# Patient Record
Sex: MALE | Race: WHITE
[De-identification: names, ages, dates, MRNs, and addresses within clinical notes are randomized per-mention and may not be internally consistent; named-entity substitution may affect disease eponyms.]

---

## 2018-01-31 ENCOUNTER — HOSPITAL ENCOUNTER (OUTPATIENT)
Dept: HOSPITAL 94 - SSTAY O | Age: 58
Discharge: HOME | End: 2018-01-31
Attending: INTERNAL MEDICINE
Payer: MEDICARE

## 2018-01-31 VITALS — DIASTOLIC BLOOD PRESSURE: 76 MMHG | SYSTOLIC BLOOD PRESSURE: 122 MMHG

## 2018-01-31 VITALS — DIASTOLIC BLOOD PRESSURE: 80 MMHG | SYSTOLIC BLOOD PRESSURE: 112 MMHG

## 2018-01-31 VITALS — SYSTOLIC BLOOD PRESSURE: 116 MMHG | DIASTOLIC BLOOD PRESSURE: 94 MMHG

## 2018-01-31 VITALS — DIASTOLIC BLOOD PRESSURE: 78 MMHG | SYSTOLIC BLOOD PRESSURE: 137 MMHG

## 2018-01-31 VITALS — SYSTOLIC BLOOD PRESSURE: 139 MMHG | DIASTOLIC BLOOD PRESSURE: 99 MMHG

## 2018-01-31 VITALS — SYSTOLIC BLOOD PRESSURE: 135 MMHG | DIASTOLIC BLOOD PRESSURE: 74 MMHG

## 2018-01-31 VITALS — SYSTOLIC BLOOD PRESSURE: 145 MMHG | DIASTOLIC BLOOD PRESSURE: 80 MMHG

## 2018-01-31 VITALS — HEIGHT: 69 IN | WEIGHT: 225.53 LBS | BODY MASS INDEX: 33.4 KG/M2

## 2018-01-31 VITALS — DIASTOLIC BLOOD PRESSURE: 73 MMHG | SYSTOLIC BLOOD PRESSURE: 126 MMHG

## 2018-01-31 VITALS — SYSTOLIC BLOOD PRESSURE: 146 MMHG | DIASTOLIC BLOOD PRESSURE: 71 MMHG

## 2018-01-31 VITALS — DIASTOLIC BLOOD PRESSURE: 67 MMHG | SYSTOLIC BLOOD PRESSURE: 125 MMHG

## 2018-01-31 VITALS — DIASTOLIC BLOOD PRESSURE: 71 MMHG | SYSTOLIC BLOOD PRESSURE: 109 MMHG

## 2018-01-31 VITALS — SYSTOLIC BLOOD PRESSURE: 133 MMHG | DIASTOLIC BLOOD PRESSURE: 79 MMHG

## 2018-01-31 VITALS — SYSTOLIC BLOOD PRESSURE: 122 MMHG | DIASTOLIC BLOOD PRESSURE: 82 MMHG

## 2018-01-31 DIAGNOSIS — I25.10: Primary | ICD-10-CM

## 2018-01-31 DIAGNOSIS — I10: ICD-10-CM

## 2018-01-31 DIAGNOSIS — E78.5: ICD-10-CM

## 2018-01-31 DIAGNOSIS — M19.90: ICD-10-CM

## 2018-01-31 DIAGNOSIS — Z95.5: ICD-10-CM

## 2018-01-31 DIAGNOSIS — E03.9: ICD-10-CM

## 2018-01-31 DIAGNOSIS — F17.210: ICD-10-CM

## 2018-01-31 LAB
HCT VFR BLD CALC: 35 %PCV (ref 42–52)
HCT VFR BLD CALC: 35 %PCV (ref 42–52)
HGB BLD CALC-MCNC: 11.9 G/DL (ref 14–18)
SAO2 % BLDA FROM PO2: 99 % (ref 95–98)
SAO2 % BLDV FROM PO2: 54 % (ref 60–80)
SPECIMEN SOURCE: (no result)

## 2018-01-31 PROCEDURE — 93005 ELECTROCARDIOGRAM TRACING: CPT

## 2018-01-31 PROCEDURE — 85014 HEMATOCRIT: CPT

## 2018-01-31 PROCEDURE — 82803 BLOOD GASES ANY COMBINATION: CPT

## 2018-01-31 PROCEDURE — 99153 MOD SED SAME PHYS/QHP EA: CPT

## 2018-01-31 PROCEDURE — 93460 R&L HRT ART/VENTRICLE ANGIO: CPT

## 2018-01-31 PROCEDURE — 99152 MOD SED SAME PHYS/QHP 5/>YRS: CPT

## 2019-02-20 ENCOUNTER — HOSPITAL ENCOUNTER (INPATIENT)
Dept: HOSPITAL 94 - ER | Age: 59
LOS: 1 days | Discharge: HOME | DRG: 287 | End: 2019-02-21
Attending: HOSPITALIST | Admitting: HOSPITALIST
Payer: MEDICARE

## 2019-02-20 VITALS — WEIGHT: 226.48 LBS | HEIGHT: 70 IN | BODY MASS INDEX: 32.42 KG/M2

## 2019-02-20 VITALS — DIASTOLIC BLOOD PRESSURE: 69 MMHG | SYSTOLIC BLOOD PRESSURE: 116 MMHG

## 2019-02-20 VITALS — DIASTOLIC BLOOD PRESSURE: 75 MMHG | SYSTOLIC BLOOD PRESSURE: 115 MMHG

## 2019-02-20 DIAGNOSIS — M54.9: ICD-10-CM

## 2019-02-20 DIAGNOSIS — I25.10: ICD-10-CM

## 2019-02-20 DIAGNOSIS — I25.2: ICD-10-CM

## 2019-02-20 DIAGNOSIS — E03.9: ICD-10-CM

## 2019-02-20 DIAGNOSIS — E78.00: ICD-10-CM

## 2019-02-20 DIAGNOSIS — Z79.82: ICD-10-CM

## 2019-02-20 DIAGNOSIS — R07.89: Primary | ICD-10-CM

## 2019-02-20 DIAGNOSIS — Z95.5: ICD-10-CM

## 2019-02-20 DIAGNOSIS — Z79.899: ICD-10-CM

## 2019-02-20 DIAGNOSIS — J44.9: ICD-10-CM

## 2019-02-20 DIAGNOSIS — M19.90: ICD-10-CM

## 2019-02-20 DIAGNOSIS — E78.5: ICD-10-CM

## 2019-02-20 DIAGNOSIS — Z87.891: ICD-10-CM

## 2019-02-20 DIAGNOSIS — G89.29: ICD-10-CM

## 2019-02-20 DIAGNOSIS — I10: ICD-10-CM

## 2019-02-20 LAB
ALBUMIN SERPL BCP-MCNC: 3.7 G/DL (ref 3.4–5)
ALBUMIN/GLOB SERPL: 1.1 {RATIO} (ref 1.1–1.5)
ALP SERPL-CCNC: 100 IU/L (ref 46–116)
ALT SERPL W P-5'-P-CCNC: 40 U/L (ref 12–78)
ANION GAP SERPL CALCULATED.3IONS-SCNC: 11 MMOL/L (ref 8–16)
AST SERPL W P-5'-P-CCNC: 24 U/L (ref 10–37)
BASOPHILS # BLD AUTO: 0 X10'3 (ref 0–0.2)
BASOPHILS NFR BLD AUTO: 0.6 % (ref 0–1)
BILIRUB SERPL-MCNC: 0.8 MG/DL (ref 0.1–1)
BUN SERPL-MCNC: 21 MG/DL (ref 7–18)
BUN/CREAT SERPL: 23.1 (ref 5.4–32)
CALCIUM SERPL-MCNC: 9.2 MG/DL (ref 8.5–10.1)
CHLORIDE SERPL-SCNC: 103 MMOL/L (ref 99–107)
CO2 SERPL-SCNC: 25.1 MMOL/L (ref 24–32)
CREAT SERPL-MCNC: 0.91 MG/DL (ref 0.6–1.1)
EOSINOPHIL # BLD AUTO: 0.4 X10'3 (ref 0–0.9)
EOSINOPHIL NFR BLD AUTO: 4.7 % (ref 0–6)
ERYTHROCYTE [DISTWIDTH] IN BLOOD BY AUTOMATED COUNT: 14 % (ref 11.5–14.5)
GFR SERPL CREATININE-BSD FRML MDRD: 85 ML/MIN
GLUCOSE SERPL-MCNC: 95 MG/DL (ref 70–104)
HCT VFR BLD AUTO: 43.7 % (ref 42–52)
HGB BLD-MCNC: 14.9 G/DL (ref 14–17.9)
LYMPHOCYTES # BLD AUTO: 2.4 X10'3 (ref 1.1–4.8)
LYMPHOCYTES NFR BLD AUTO: 29.6 % (ref 21–51)
MAGNESIUM SERPL-MCNC: 1.6 MG/DL (ref 1.5–2.4)
MCH RBC QN AUTO: 31.7 PG (ref 27–31)
MCHC RBC AUTO-ENTMCNC: 34 G/DL (ref 33–36.5)
MCV RBC AUTO: 93.2 FL (ref 78–98)
MONOCYTES # BLD AUTO: 0.6 X10'3 (ref 0–0.9)
MONOCYTES NFR BLD AUTO: 7.6 % (ref 2–12)
NEUTROPHILS # BLD AUTO: 4.7 X10'3 (ref 1.8–7.7)
NEUTROPHILS NFR BLD AUTO: 57.5 % (ref 42–75)
PLATELET # BLD AUTO: 257 X10'3 (ref 140–440)
PMV BLD AUTO: 7.7 FL (ref 7.4–10.4)
POTASSIUM SERPL-SCNC: 4.2 MMOL/L (ref 3.5–5.1)
PROT SERPL-MCNC: 7 G/DL (ref 6.4–8.2)
RBC # BLD AUTO: 4.7 X10'6 (ref 4.7–6.1)
SODIUM SERPL-SCNC: 139 MMOL/L (ref 135–145)
WBC # BLD AUTO: 8.3 X10'3 (ref 4.5–11)

## 2019-02-20 PROCEDURE — 93005 ELECTROCARDIOGRAM TRACING: CPT

## 2019-02-20 PROCEDURE — 85730 THROMBOPLASTIN TIME PARTIAL: CPT

## 2019-02-20 PROCEDURE — 80053 COMPREHEN METABOLIC PANEL: CPT

## 2019-02-20 PROCEDURE — 85347 COAGULATION TIME ACTIVATED: CPT

## 2019-02-20 PROCEDURE — 83735 ASSAY OF MAGNESIUM: CPT

## 2019-02-20 PROCEDURE — 83880 ASSAY OF NATRIURETIC PEPTIDE: CPT

## 2019-02-20 PROCEDURE — 71045 X-RAY EXAM CHEST 1 VIEW: CPT

## 2019-02-20 PROCEDURE — 80061 LIPID PANEL: CPT

## 2019-02-20 PROCEDURE — 99152 MOD SED SAME PHYS/QHP 5/>YRS: CPT

## 2019-02-20 PROCEDURE — 87070 CULTURE OTHR SPECIMN AEROBIC: CPT

## 2019-02-20 PROCEDURE — 99153 MOD SED SAME PHYS/QHP EA: CPT

## 2019-02-20 PROCEDURE — 85025 COMPLETE CBC W/AUTO DIFF WBC: CPT

## 2019-02-20 PROCEDURE — 36415 COLL VENOUS BLD VENIPUNCTURE: CPT

## 2019-02-20 PROCEDURE — 93458 L HRT ARTERY/VENTRICLE ANGIO: CPT

## 2019-02-20 PROCEDURE — 84484 ASSAY OF TROPONIN QUANT: CPT

## 2019-02-20 PROCEDURE — 93567 NJX CAR CTH SPRVLV AORTGRPHY: CPT

## 2019-02-20 PROCEDURE — 93306 TTE W/DOPPLER COMPLETE: CPT

## 2019-02-20 PROCEDURE — 99291 CRITICAL CARE FIRST HOUR: CPT

## 2019-02-20 PROCEDURE — 96374 THER/PROPH/DIAG INJ IV PUSH: CPT

## 2019-02-20 RX ADMIN — HEPARIN SODIUM AND DEXTROSE SCH MLS/HR: 10000; 5 INJECTION INTRAVENOUS at 16:49

## 2019-02-20 NOTE — NUR
NITROGLYCERIN DRIP (SEE EMAR) TITRATED UP TO 20 MCG/MIN AT THIS TIME FOR 
INCREASED LEFT CHEST PAIN, STABBING, INTERMITTENT, NON RADIATING, 6/10.

## 2019-02-20 NOTE — NUR
Pt is on PCU in room 3025B. He was able to stand from gurney and get to the hospital bed 
with minimal assistance. He currently has nitro at 20mcg/kg and heparin at 1,000 units 
running. 20G RFA appears infiltrated and swollen. DC and placed new 20G in L wrist.





Soon after arriving Dr. Cuba visited pt at the bedside. He described risk and 
benefits of having an angiogram done. Pt has consented to having an angiogram done 2/21. 
Will provide printed pt education and prep pt tonight. Verbal orders for 100ml/hr of 
maintenance fluids and NPO at midnight.

## 2019-02-21 VITALS — SYSTOLIC BLOOD PRESSURE: 125 MMHG | DIASTOLIC BLOOD PRESSURE: 87 MMHG

## 2019-02-21 VITALS — SYSTOLIC BLOOD PRESSURE: 99 MMHG | DIASTOLIC BLOOD PRESSURE: 49 MMHG

## 2019-02-21 VITALS — SYSTOLIC BLOOD PRESSURE: 125 MMHG

## 2019-02-21 LAB
ALBUMIN SERPL BCP-MCNC: 3.3 G/DL (ref 3.4–5)
ALBUMIN/GLOB SERPL: 1.2 {RATIO} (ref 1.1–1.5)
ALP SERPL-CCNC: 86 IU/L (ref 46–116)
ALT SERPL W P-5'-P-CCNC: 39 U/L (ref 12–78)
ANION GAP SERPL CALCULATED.3IONS-SCNC: 8 MMOL/L (ref 8–16)
AST SERPL W P-5'-P-CCNC: 18 U/L (ref 10–37)
BASOPHILS # BLD AUTO: 0.1 X10'3 (ref 0–0.2)
BASOPHILS NFR BLD AUTO: 0.7 % (ref 0–1)
BILIRUB SERPL-MCNC: 0.7 MG/DL (ref 0.1–1)
BUN SERPL-MCNC: 25 MG/DL (ref 7–18)
BUN/CREAT SERPL: 25.3 (ref 5.4–32)
CALCIUM SERPL-MCNC: 8.6 MG/DL (ref 8.5–10.1)
CHLORIDE SERPL-SCNC: 104 MMOL/L (ref 99–107)
CHOLEST SERPL-MCNC: 138 MG/DL (ref 0–200)
CHOLEST/HDLC SERPL: 3.5 {RATIO} (ref 0–4.99)
CO2 SERPL-SCNC: 27.7 MMOL/L (ref 24–32)
CREAT SERPL-MCNC: 0.99 MG/DL (ref 0.6–1.1)
EOSINOPHIL # BLD AUTO: 0.5 X10'3 (ref 0–0.9)
EOSINOPHIL NFR BLD AUTO: 6.3 % (ref 0–6)
ERYTHROCYTE [DISTWIDTH] IN BLOOD BY AUTOMATED COUNT: 13.9 % (ref 11.5–14.5)
GFR SERPL CREATININE-BSD FRML MDRD: 77 ML/MIN
GLUCOSE SERPL-MCNC: 106 MG/DL (ref 70–104)
HCT VFR BLD AUTO: 39.9 % (ref 42–52)
HDLC SERPL-MCNC: 39 MG/DL (ref 35–60)
HGB BLD-MCNC: 13.6 G/DL (ref 14–17.9)
LDLC SERPL DIRECT ASSAY-MCNC: 87 MG/DL (ref 50–100)
LYMPHOCYTES # BLD AUTO: 1.7 X10'3 (ref 1.1–4.8)
LYMPHOCYTES NFR BLD AUTO: 21.2 % (ref 21–51)
MAGNESIUM SERPL-MCNC: 1.6 MG/DL (ref 1.5–2.4)
MCH RBC QN AUTO: 31.9 PG (ref 27–31)
MCHC RBC AUTO-ENTMCNC: 34.1 G/DL (ref 33–36.5)
MCV RBC AUTO: 93.5 FL (ref 78–98)
MONOCYTES # BLD AUTO: 0.6 X10'3 (ref 0–0.9)
MONOCYTES NFR BLD AUTO: 8.2 % (ref 2–12)
NEUTROPHILS # BLD AUTO: 5 X10'3 (ref 1.8–7.7)
NEUTROPHILS NFR BLD AUTO: 63.6 % (ref 42–75)
PLATELET # BLD AUTO: 235 X10'3 (ref 140–440)
PMV BLD AUTO: 8.1 FL (ref 7.4–10.4)
POTASSIUM SERPL-SCNC: 4.4 MMOL/L (ref 3.5–5.1)
PROT SERPL-MCNC: 6.1 G/DL (ref 6.4–8.2)
RBC # BLD AUTO: 4.27 X10'6 (ref 4.7–6.1)
SODIUM SERPL-SCNC: 140 MMOL/L (ref 135–145)
TRIGL SERPL-MCNC: 108 MG/DL (ref 20–135)
WBC # BLD AUTO: 7.9 X10'3 (ref 4.5–11)

## 2019-02-21 PROCEDURE — 4A023N7 MEASUREMENT OF CARDIAC SAMPLING AND PRESSURE, LEFT HEART, PERCUTANEOUS APPROACH: ICD-10-PCS | Performed by: INTERNAL MEDICINE

## 2019-02-21 PROCEDURE — B2151ZZ FLUOROSCOPY OF LEFT HEART USING LOW OSMOLAR CONTRAST: ICD-10-PCS | Performed by: INTERNAL MEDICINE

## 2019-02-21 PROCEDURE — B2111ZZ FLUOROSCOPY OF MULTIPLE CORONARY ARTERIES USING LOW OSMOLAR CONTRAST: ICD-10-PCS | Performed by: INTERNAL MEDICINE

## 2019-02-21 PROCEDURE — B3101ZZ FLUOROSCOPY OF THORACIC AORTA USING LOW OSMOLAR CONTRAST: ICD-10-PCS | Performed by: INTERNAL MEDICINE

## 2019-02-21 RX ADMIN — HEPARIN SODIUM AND DEXTROSE SCH MLS/HR: 10000; 5 INJECTION INTRAVENOUS at 01:18

## 2019-02-21 RX ADMIN — SODIUM CHLORIDE SCH MLS/HR: 9 INJECTION INTRAMUSCULAR; INTRAVENOUS; SUBCUTANEOUS at 01:19

## 2019-02-21 RX ADMIN — SODIUM CHLORIDE SCH MLS/HR: 9 INJECTION INTRAMUSCULAR; INTRAVENOUS; SUBCUTANEOUS at 09:20

## 2019-02-21 NOTE — NUR
patient returned from cath lab , bedside report was that the angiogram was uneventful and 
heparin and nitro have been stopped. prilosec order received, and late tray ordered for 
patient

## 2019-02-21 NOTE — NUR
patient discharged per MD order, prescriptions called in and discharge packet with 
instructions provided

## 2019-02-21 NOTE — NUR
Pt started complaining of 8 out of 10 chest pain. No radiating pain. Paged Dr. Artis who is 
aware that pt is going for heart cath today and gave telephone orders to give IV morphine 
1mg. 12 lead EKG performed per protocol. Pt still shows sinus rhythm. Per tele tech no 
changes as well. Heart cath scheduled for 0800 this morning with Dr. Caraballo.

## 2019-02-21 NOTE — NUR
PAGER ID:  [phone] 

MESSAGE:  Anahy RANKIN/[id number], pt Jefferyiley [id number] c/o chest pain, nausea. He has Heparin gtt and 
Nitroglycerin running. He has angiogram at 0800 in the morning.


-------------------------------------------------------------------------------

Addendum: 02/21/19 at 0545 by Anahy Chavez RN

-------------------------------------------------------------------------------

Baldev Franco called back, ordered to give pt christina

## 2019-02-21 NOTE — NUR
Pt's PTT is 31 which is low according to cardiac Heparin gtt protocol. Because pt's heparin 
gtt was started at [facility] no re bolus per protocol was added into pt's orders. Called Dr. De Paz who gave telephone orders to add in cardiac re bolus. And okay'd PTT's to be ordered as 
"Cardiac PTT" not "DVT PTT which was initially put in for first PTT draw here at [facility].

## 2019-02-21 NOTE — NUR
Problems reprioritized. Patient report given, questions answered & plan of care reviewed 
with Susy RANKIN.